# Patient Record
Sex: FEMALE | Race: AMERICAN INDIAN OR ALASKA NATIVE | NOT HISPANIC OR LATINO
[De-identification: names, ages, dates, MRNs, and addresses within clinical notes are randomized per-mention and may not be internally consistent; named-entity substitution may affect disease eponyms.]

---

## 2017-03-29 ENCOUNTER — RESULT REVIEW (OUTPATIENT)
Age: 67
End: 2017-03-29

## 2017-03-30 ENCOUNTER — APPOINTMENT (OUTPATIENT)
Dept: OBGYN | Facility: CLINIC | Age: 67
End: 2017-03-30

## 2017-04-08 ENCOUNTER — OUTPATIENT (OUTPATIENT)
Dept: OUTPATIENT SERVICES | Facility: HOSPITAL | Age: 67
LOS: 1 days | End: 2017-04-08
Payer: COMMERCIAL

## 2017-04-08 ENCOUNTER — APPOINTMENT (OUTPATIENT)
Dept: MRI IMAGING | Facility: IMAGING CENTER | Age: 67
End: 2017-04-08

## 2017-04-08 DIAGNOSIS — Z00.8 ENCOUNTER FOR OTHER GENERAL EXAMINATION: ICD-10-CM

## 2017-04-08 PROCEDURE — 70553 MRI BRAIN STEM W/O & W/DYE: CPT

## 2017-04-08 PROCEDURE — A9585: CPT

## 2017-04-08 PROCEDURE — 82565 ASSAY OF CREATININE: CPT

## 2018-05-10 ENCOUNTER — RESULT REVIEW (OUTPATIENT)
Age: 68
End: 2018-05-10

## 2018-05-10 ENCOUNTER — APPOINTMENT (OUTPATIENT)
Dept: OBGYN | Facility: CLINIC | Age: 68
End: 2018-05-10
Payer: MEDICARE

## 2018-05-10 PROCEDURE — G0101: CPT

## 2018-08-01 ENCOUNTER — EMERGENCY (EMERGENCY)
Facility: HOSPITAL | Age: 68
LOS: 1 days | Discharge: HOME | End: 2018-08-01
Attending: EMERGENCY MEDICINE
Payer: MEDICARE

## 2018-08-01 VITALS
SYSTOLIC BLOOD PRESSURE: 122 MMHG | HEIGHT: 65 IN | DIASTOLIC BLOOD PRESSURE: 83 MMHG | RESPIRATION RATE: 20 BRPM | HEART RATE: 79 BPM | WEIGHT: 115.08 LBS | OXYGEN SATURATION: 99 %

## 2018-08-01 PROCEDURE — 73564 X-RAY EXAM KNEE 4 OR MORE: CPT | Mod: 26,50

## 2018-08-01 PROCEDURE — 73130 X-RAY EXAM OF HAND: CPT

## 2018-08-01 PROCEDURE — 99284 EMERGENCY DEPT VISIT MOD MDM: CPT | Mod: GC

## 2018-08-01 PROCEDURE — 73110 X-RAY EXAM OF WRIST: CPT | Mod: 26,LT

## 2018-08-01 PROCEDURE — 99284 EMERGENCY DEPT VISIT MOD MDM: CPT

## 2018-08-01 PROCEDURE — 73564 X-RAY EXAM KNEE 4 OR MORE: CPT

## 2018-08-01 PROCEDURE — 73130 X-RAY EXAM OF HAND: CPT | Mod: 26,LT

## 2018-08-01 PROCEDURE — 73110 X-RAY EXAM OF WRIST: CPT

## 2018-08-01 RX ORDER — OXYCODONE AND ACETAMINOPHEN 5; 325 MG/1; MG/1
1 TABLET ORAL ONCE
Qty: 0 | Refills: 0 | Status: DISCONTINUED | OUTPATIENT
Start: 2018-08-01 | End: 2018-08-01

## 2018-08-01 RX ADMIN — OXYCODONE AND ACETAMINOPHEN 1 TABLET(S): 5; 325 TABLET ORAL at 15:21

## 2018-08-01 NOTE — ED PROVIDER NOTE - PMH
Esophageal cancer    Fracture  right 3 rd & 4 th metatarsal  Hyperlipidemia    Hypothyroidism    PFO (patent foramen ovale)  old history , not in recent imagings

## 2018-08-01 NOTE — ED ADULT NURSE NOTE - OBJECTIVE STATEMENT
67 yr old female a/oX 3 c/o mechanical fall and c/o left arm and b/l knee pain.  Denies head trauma or loc.  No midline c spine tenderness.  Normal Rom and neurovascular status in all 4 extremities

## 2018-08-01 NOTE — ED PROVIDER NOTE - CARE PLAN
Principal Discharge DX:	Contusion of knee, unspecified laterality, initial encounter  Secondary Diagnosis:	Multiple abrasions  Secondary Diagnosis:	Hand abrasion, infected, unspecified laterality, initial encounter

## 2018-08-01 NOTE — ED PROVIDER NOTE - PHYSICAL EXAMINATION
Gen: AAOx3, non-toxic  Head: NCAT, full ROM of neck, no midline tenderness in entire spine.   HEENT: EOMI, oral mucosa moist, normal conjunctiva  Lung: CTAB, no respiratory distress, no wheezes/rhonchi/rales B/L, speaking in full sentences  CV: RRR, no murmurs, rubs or gallops  Abd: soft, NTND, no guarding, no CVA tenderness  MSK: pain on passive and active ranging of the bilateral knees and left wrist + TTP in those areas as well   Neuro: general numbness of the left hand. No focal sensory or motor deficits, normal CN exam   Skin: Abrasions as described in HPI. Warm, well perfused, no rash  Psych: normal affect.   ~Arias Degroot PGY1 Gen: AAOx3, non-toxic  Head: NCAT, full ROM of neck, no midline tenderness in entire spine.   HEENT: EOMI, oral mucosa moist, normal conjunctiva  Lung: CTAB, no respiratory distress, no wheezes/rhonchi/rales B/L, speaking in full sentences  CV: RRR, no murmurs, rubs or gallops  Abd: soft, NTND, no guarding, no CVA tenderness  MSK: pain on passive and active ranging of the bilateral knees and left wrist + TTP in those areas as well   Neuro: general numbness of the left hand. No focal sensory or motor deficits, normal CN exam   Skin: Abrasions as described in HPI. Warm, well perfused, no rash  Psych: normal affect.   ~Arias Degroot PGY1       Attending note. Patient is alert and in moderate distress. Head is normocephalic and atraumatic. Patient has no midline cervical tenderness. Patient has slight tenderness in the right and left trapezius muscles. Back and spine are nontender. Chest/RIBS nontender. There is no CVA tenderness. Abdomen is soft nontender. Pelvis and hips are nontender. Chest for range of motion of the upper extremities bilaterally except for her left wrist. There is an abrasion on the palm of the left hand. There is no deformity, swelling or ecchymosis of the left wrist or hand area sensation is intact and normal. Examination of the lower extremity reveals bilateral abrasions of the anterior knees with right greater than left. Patient is able to flex both knees to approximately 90°. There is no swelling or effusions of the knees. Lower legs ankles and feet are unremarkable. Neurologic examination is grossly intact.

## 2018-08-01 NOTE — ED PROVIDER NOTE - OBJECTIVE STATEMENT
66 y/o female with PMHx esophageal cancer s/p esophagectomy presenting to the ED with L arm and bilateral knee pain after a fall. She states that she was walking home from her place of work when she tripped over a crack in the pavement and fell on her knees and left arm. She states that she "cannot remember" whether or not she hit her head or loss consciousness. Since the fall she has been experiencing pain over both knee caps with associates abrasions, as well as pain and numbness over the palmar aspect of the left hand with a small abrasion at the base of the fourth finger. Also denies any headache, changes in vision, weakness, paralysis, confusion, nausea, vomiting, abdominal pain, chest pain, or SOB. States that she is up to date on her vaccinations and that she received her tetanus vaccine last year. Not on blood thinners, no history osteoporosis. Son who is in the room states that the pt is at her baseline mental status. 66 y/o female with PMHx esophageal cancer s/p esophagectomy presenting to the ED with L arm and bilateral knee pain after a fall. She states that she was walking home from her place of work when she tripped over a crack in the pavement and fell on her knees and left arm. She states that she "cannot remember" whether or not she hit her head or loss consciousness. Since the fall she has been experiencing pain over both knee caps with associates abrasions, as well as pain and numbness over the palmar aspect of the left hand with a small abrasion at the base of the fourth finger. Also denies any headache, changes in vision, weakness, paralysis, confusion, nausea, vomiting, abdominal pain, chest pain, or SOB. States that she is up to date on her vaccinations and that she received her tetanus vaccine last year. Not on blood thinners, no history osteoporosis. Son who is in the room states that the pt is at her baseline mental status.        Attending note. Patient was seen in the fast track from #3. Agree with the above. Patient tripped over uneven pavement causing her to fall. Patient sustained injuries and abrasions to the palm of the left hand as well as both knees. Patient is complaining of some stiffness in her neck and lower back. Patient denies any head injury, chest/rib injury, pain in the hips, ankles, elbows or shoulders. Patient has no numbness or paresthesia. Patient has no headaches, dizziness, nausea or vomiting.

## 2018-08-01 NOTE — ED PROVIDER NOTE - MEDICAL DECISION MAKING DETAILS
66 y/o female with PMHx esophageal cancer s/p esophagectomy presenting to the ED with L arm and bilateral knee pain + associated abrasions after an accidental fall. Due to mechanism and pain on ranging affected joints, fractures of the pattellas and distal radius/carpal bones must be ruled out. Will order x-ray of the bilateral knees + left wrist/hand. Abrasions are superficial and do not require suture repair. The patient states that she cannot recall whether or not she hit her head or suffered LOC, but there is no evidence of trauma, no midline spinal tenderness, normal cervical ROM, no focal neurologic findings, and the pt is at normal mentation per son--no concern for intracranial or C-spine pathology at this time. Will reassess following image results. 68 y/o female with PMHx esophageal cancer s/p esophagectomy presenting to the ED with L arm and bilateral knee pain + associated abrasions after an accidental fall. Due to mechanism and pain on ranging affected joints, fractures of the pattellas and distal radius/carpal bones must be ruled out. Will order x-ray of the bilateral knees + left wrist/hand. Abrasions are superficial and do not require suture repair. The patient states that she cannot recall whether or not she hit her head or suffered LOC, but there is no evidence of trauma, no midline spinal tenderness, normal cervical ROM, no focal neurologic findings, and the pt is at normal mentation per son--no concern for intracranial or C-spine pathology at this time. Will reassess following image results.       Attending note-trip and fall on uneven pavement. Abrasions to left palm and bilateral knees. X-rays rule out fractures no low suspicion. Analgesia, wound care. Tetanus is up-to-date.

## 2018-08-01 NOTE — ED PROVIDER NOTE - NS ED ROS FT
GENERAL: No fever or chills  EYES: no change in vision  HEENT: no trouble swallowing or speaking  CARDIAC: no chest pain  PULMONARY: no cough or SOB  GI: no abdominal pain, no nausea, no vomiting, no diarrhea or constipation  : No changes in urination  SKIN: abrasions over both patella, small lac on left palm at base of 4th finger  NEURO: numbness of left hand, no headache, changes in vision, weakness, paralysis   MSK: pain of both knees and left hand     ~Arias Degroot PGY1

## 2019-03-27 ENCOUNTER — RESULT REVIEW (OUTPATIENT)
Age: 69
End: 2019-03-27

## 2019-03-28 ENCOUNTER — APPOINTMENT (OUTPATIENT)
Dept: OBGYN | Facility: CLINIC | Age: 69
End: 2019-03-28
Payer: MEDICARE

## 2019-03-28 PROCEDURE — G0101: CPT

## 2019-04-16 ENCOUNTER — APPOINTMENT (OUTPATIENT)
Dept: OBGYN | Facility: CLINIC | Age: 69
End: 2019-04-16
Payer: MEDICARE

## 2019-04-16 PROCEDURE — 57454 BX/CURETT OF CERVIX W/SCOPE: CPT

## 2019-04-17 ENCOUNTER — RESULT REVIEW (OUTPATIENT)
Age: 69
End: 2019-04-17

## 2019-07-16 ENCOUNTER — EMERGENCY (EMERGENCY)
Facility: HOSPITAL | Age: 69
LOS: 1 days | Discharge: HOME | End: 2019-07-16
Attending: EMERGENCY MEDICINE
Payer: COMMERCIAL

## 2019-07-16 VITALS
RESPIRATION RATE: 18 BRPM | SYSTOLIC BLOOD PRESSURE: 107 MMHG | HEART RATE: 91 BPM | HEIGHT: 66 IN | OXYGEN SATURATION: 99 % | DIASTOLIC BLOOD PRESSURE: 72 MMHG | TEMPERATURE: 98 F | WEIGHT: 113.1 LBS

## 2019-07-16 PROCEDURE — 99283 EMERGENCY DEPT VISIT LOW MDM: CPT

## 2019-07-16 RX ORDER — IBUPROFEN 200 MG
600 TABLET ORAL ONCE
Refills: 0 | Status: COMPLETED | OUTPATIENT
Start: 2019-07-16 | End: 2019-07-16

## 2019-07-16 RX ORDER — LIDOCAINE 4 G/100G
1 CREAM TOPICAL ONCE
Refills: 0 | Status: COMPLETED | OUTPATIENT
Start: 2019-07-16 | End: 2019-07-16

## 2019-07-16 RX ORDER — ACETAMINOPHEN 500 MG
975 TABLET ORAL ONCE
Refills: 0 | Status: COMPLETED | OUTPATIENT
Start: 2019-07-16 | End: 2019-07-16

## 2019-07-16 RX ADMIN — Medication 600 MILLIGRAM(S): at 21:28

## 2019-07-16 RX ADMIN — Medication 975 MILLIGRAM(S): at 21:28

## 2019-07-16 RX ADMIN — LIDOCAINE 1 PATCH: 4 CREAM TOPICAL at 21:28
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
## 2019-07-16 NOTE — ED PROVIDER NOTE - NSFOLLOWUPINSTRUCTIONS_ED_ALL_ED_FT
- stay hydrated.   - alternate between tylenol 975mg and ibuprofen 600mg every 4 hours as needed for pain-take with meals.  - follow up with your pcp in 1-2 days.  - return if symptoms worsen, fever, weakness, numbness/tingling, blurred vision, difficulty ambulating and all other concerns. - stay hydrated.   - alternate between tylenol 975mg and ibuprofen 600mg every 4 hours as needed for pain-take with meals. Use salon-pas lidocaine patches every 12  hours as needed for pain .  - follow up with your pcp in 1-2 days.  - return if symptoms worsen, fever, weakness, numbness/tingling, blurred vision, difficulty ambulating and all other concerns.
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
## 2019-07-16 NOTE — ED PROVIDER NOTE - PHYSICAL EXAMINATION
A&Ox3, NAD. NCAT. PERRL, EOMI. Neck supple, + tenderness to superior trapezius, no vertebral tenderness no LAD. Lungs CTAB. +S1S2, RRR, No m/r/g. Abd soft, NT/ND, +BS, no rebound or guarding. Extremities: cap refill <2, pulses in distal extremities 4+, no edema. Skin without rash. CN II-XII intact. Strength 5/5 UE/LE. Left LE ROM decreased d/t pain Sensations intact throughout. Gait steady.
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
## 2019-07-16 NOTE — ED PROVIDER NOTE - NS ED MD DISPO DISCHARGE CCDA
Problem: Physical Therapy Goal  Goal: Physical Therapy Goal  Pt evaluated and demo ability to perform functional mobility without assistance - currently limited by pain.  PT orders to be d/c as pt scheduled for sx tomorrow per pt's report and chart review.  D/C note to follow.      Outcome: Outcome(s) achieved Date Met: 03/20/19  Jennifer and D/C    Campbell Sinclair, PT,DPT  3/20/2019           Patient/Caregiver provided printed discharge information.

## 2020-06-24 ENCOUNTER — APPOINTMENT (OUTPATIENT)
Dept: NUCLEAR MEDICINE | Facility: IMAGING CENTER | Age: 70
End: 2020-06-24
Payer: MEDICARE

## 2020-06-24 ENCOUNTER — OUTPATIENT (OUTPATIENT)
Dept: OUTPATIENT SERVICES | Facility: HOSPITAL | Age: 70
LOS: 1 days | End: 2020-06-24
Payer: MEDICARE

## 2020-06-24 DIAGNOSIS — Z00.8 ENCOUNTER FOR OTHER GENERAL EXAMINATION: ICD-10-CM

## 2020-06-24 PROCEDURE — 78815 PET IMAGE W/CT SKULL-THIGH: CPT | Mod: 26,PI

## 2020-06-24 PROCEDURE — 78815 PET IMAGE W/CT SKULL-THIGH: CPT

## 2020-06-24 PROCEDURE — A9552: CPT

## 2020-06-29 ENCOUNTER — APPOINTMENT (OUTPATIENT)
Dept: CT IMAGING | Facility: IMAGING CENTER | Age: 70
End: 2020-06-29
Payer: MEDICARE

## 2020-06-29 ENCOUNTER — OUTPATIENT (OUTPATIENT)
Dept: OUTPATIENT SERVICES | Facility: HOSPITAL | Age: 70
LOS: 1 days | End: 2020-06-29
Payer: MEDICARE

## 2020-06-29 DIAGNOSIS — Z00.8 ENCOUNTER FOR OTHER GENERAL EXAMINATION: ICD-10-CM

## 2020-06-29 PROCEDURE — 74177 CT ABD & PELVIS W/CONTRAST: CPT | Mod: 26

## 2020-06-29 PROCEDURE — 82565 ASSAY OF CREATININE: CPT

## 2020-06-29 PROCEDURE — 74177 CT ABD & PELVIS W/CONTRAST: CPT

## 2020-06-29 PROCEDURE — 71260 CT THORAX DX C+: CPT | Mod: 26

## 2020-06-29 PROCEDURE — 71260 CT THORAX DX C+: CPT

## 2020-11-23 NOTE — ED PROVIDER NOTE - NS ED ATTENDING STATEMENT MOD
normal appearance , without tenderness upon palpation , no deformities , trachea midline , Thyroid normal size , no thyroid nodules , no masses , no JVD , thyroid nontender I have personally seen and examined this patient.  I have fully participated in the care of this patient. I have reviewed all pertinent clinical information, including history, physical exam, plan and the Resident’s note and agree except as noted.

## 2021-05-04 ENCOUNTER — FORM ENCOUNTER (OUTPATIENT)
Age: 71
End: 2021-05-04

## 2021-08-09 ENCOUNTER — FORM ENCOUNTER (OUTPATIENT)
Age: 71
End: 2021-08-09

## 2021-08-10 ENCOUNTER — RESULT REVIEW (OUTPATIENT)
Age: 71
End: 2021-08-10

## 2021-08-10 ENCOUNTER — APPOINTMENT (OUTPATIENT)
Dept: OBGYN | Facility: CLINIC | Age: 71
End: 2021-08-10
Payer: MEDICARE

## 2021-08-10 PROCEDURE — G0101: CPT

## 2021-08-16 ENCOUNTER — FORM ENCOUNTER (OUTPATIENT)
Age: 71
End: 2021-08-16

## 2021-08-17 ENCOUNTER — FORM ENCOUNTER (OUTPATIENT)
Age: 71
End: 2021-08-17

## 2021-08-26 ENCOUNTER — FORM ENCOUNTER (OUTPATIENT)
Age: 71
End: 2021-08-26

## 2022-06-20 DIAGNOSIS — Z78.0 ASYMPTOMATIC MENOPAUSAL STATE: ICD-10-CM

## 2022-06-20 DIAGNOSIS — Z80.51 FAMILY HISTORY OF MALIGNANT NEOPLASM OF KIDNEY: ICD-10-CM

## 2022-06-20 DIAGNOSIS — E78.00 PURE HYPERCHOLESTEROLEMIA, UNSPECIFIED: ICD-10-CM

## 2022-06-20 DIAGNOSIS — Z85.01 PERSONAL HISTORY OF MALIGNANT NEOPLASM OF ESOPHAGUS: ICD-10-CM

## 2022-06-20 DIAGNOSIS — E03.9 HYPOTHYROIDISM, UNSPECIFIED: ICD-10-CM

## 2022-06-20 DIAGNOSIS — Z86.19 PERSONAL HISTORY OF OTHER INFECTIOUS AND PARASITIC DISEASES: ICD-10-CM

## 2022-06-20 DIAGNOSIS — Z80.49 FAMILY HISTORY OF MALIGNANT NEOPLASM OF OTHER GENITAL ORGANS: ICD-10-CM

## 2022-06-20 RX ORDER — CALCIUM CITRATE/VITAMIN D3 315MG-6.25
TABLET ORAL
Refills: 0 | Status: ACTIVE | COMMUNITY

## 2022-06-20 RX ORDER — OMEPRAZOLE 20 MG/1
TABLET, DELAYED RELEASE ORAL
Refills: 0 | Status: ACTIVE | COMMUNITY

## 2022-06-20 RX ORDER — LEVOTHYROXINE SODIUM 0.05 MG/1
50 TABLET ORAL
Refills: 0 | Status: ACTIVE | COMMUNITY

## 2022-06-20 RX ORDER — ATORVASTATIN CALCIUM 20 MG/1
20 TABLET, FILM COATED ORAL
Refills: 0 | Status: ACTIVE | COMMUNITY

## 2022-08-30 ENCOUNTER — APPOINTMENT (OUTPATIENT)
Dept: OBGYN | Facility: CLINIC | Age: 72
End: 2022-08-30

## 2022-08-30 VITALS
WEIGHT: 105 LBS | DIASTOLIC BLOOD PRESSURE: 81 MMHG | BODY MASS INDEX: 18.61 KG/M2 | HEIGHT: 63 IN | SYSTOLIC BLOOD PRESSURE: 135 MMHG

## 2022-08-30 DIAGNOSIS — Z00.00 ENCOUNTER FOR GENERAL ADULT MEDICAL EXAMINATION W/OUT ABNORMAL FINDINGS: ICD-10-CM

## 2022-08-30 PROCEDURE — 99213 OFFICE O/P EST LOW 20 MIN: CPT

## 2022-08-30 NOTE — HISTORY OF PRESENT ILLNESS
[FreeTextEntry1] : 73 yo , postmenopausal, presents for f/u of abnormal pap. Pap 2021, revealed LSIL. She feels well and offers no complaints. Pt plans to get breast mammo/sono in October. \par \par GYNHx: HPV, LSIL (2021)\par PMHx: high cholesterol, hypothyroidism, esophageal CA\par PSHx: esophagectomy \par  [PapSmeardate] : 8/2021 [TextBox_31] : LSIL [ColonoscopyDate] : 2019

## 2022-08-30 NOTE — END OF VISIT
[FreeTextEntry3] : I, Celine Rehman, acted as a scribe on behalf of Dr. Mara Saeed on 08/30/2022. \par \par All medical entries made by the scribe where at my, Dr. Mara Saeed, direction and personally dictated by me on 08/30/2022. I have reviewed the chart and agree that the record accurately reflects my personal performance of the history, physical exam, assessment, and plan. I have also personally directed, reviewed and agreed with the chart.\par

## 2022-08-30 NOTE — PLAN
[FreeTextEntry1] : 71 yo , postmenopausal, abnormal pap. \par \par Abnormal pap \par -repeat pap done today\par -vit D/exercise/calcium\par -rx given for bone density\par -rx given for breast mammo/sono ([facility])\par -colon screening reviewed, utd. \par -f/u PCP for annual and appropriate immunizations\par -rto 1 year for annual. \par

## 2022-09-07 LAB
CYTOLOGY CVX/VAG DOC THIN PREP: ABNORMAL
HPV HIGH+LOW RISK DNA PNL CVX: DETECTED

## 2022-10-21 ENCOUNTER — APPOINTMENT (OUTPATIENT)
Dept: OBGYN | Facility: CLINIC | Age: 72
End: 2022-10-21

## 2022-10-21 VITALS
SYSTOLIC BLOOD PRESSURE: 124 MMHG | BODY MASS INDEX: 19.14 KG/M2 | WEIGHT: 108 LBS | DIASTOLIC BLOOD PRESSURE: 78 MMHG | HEIGHT: 63 IN

## 2022-10-21 PROCEDURE — 57454 BX/CURETT OF CERVIX W/SCOPE: CPT

## 2022-10-21 NOTE — ASSESSMENT
[FreeTextEntry1] : 71 yo , presents for colposcopy. Pap 22 revealed LSIL, prior pap 2021 revealed LSIL as well. \par \par GYNHx: HPV, LSIL (2021)\par PMHx: high cholesterol, hypothyroidism, esophageal CA\par PSHx: esophagectomy \par

## 2022-10-21 NOTE — END OF VISIT
[FreeTextEntry3] : I, Celine Rehman, acted as a scribe on behalf of Dr. Mara Saeed on 10/21/2022. \par \par All medical entries made by the scribe where at my, Dr. Mara Saeed, direction and personally dictated by me on 10/21/2022. I have reviewed the chart and agree that the record accurately reflects my personal performance of the history, physical exam, assessment, and plan. I have also personally directed, reviewed and agreed with the chart.\par

## 2022-10-21 NOTE — PROCEDURE
[Colposcopy] : Colposcopy  [Time out performed] : Pre-procedure time out performed.  Patient's name, date of birth and procedure confirmed. [Consent Obtained] : Consent obtained [Risks] : risks [Benefits] : benefits [Alternatives] : alternatives [Patient] : patient [Infection] : infection [Bleeding] : bleeding [Allergic Reaction] : allergic reaction [Hemostasis Obtained] : Hemostasis obtained [Tolerated Well] : the patient tolerated the procedure well [No Premedication] : no premedication [Colposcopy Adequate] : colposcopy adequate [SCI Fully Visualized] : SCI fully visualized [ECC Performed] : ECC performed [No Abnormalities] : no abnormalities [Biopsy] : biopsy taken [de-identified] : LSIL [de-identified] : 1 [de-identified] : 9 oclock [de-identified] : LSIL

## 2022-11-01 LAB — CORE LAB BIOPSY: NORMAL

## 2023-07-21 ENCOUNTER — APPOINTMENT (OUTPATIENT)
Dept: NUCLEAR MEDICINE | Facility: IMAGING CENTER | Age: 73
End: 2023-07-21
Payer: MEDICARE

## 2023-07-21 ENCOUNTER — OUTPATIENT (OUTPATIENT)
Dept: OUTPATIENT SERVICES | Facility: HOSPITAL | Age: 73
LOS: 1 days | End: 2023-07-21
Payer: MEDICARE

## 2023-07-21 DIAGNOSIS — C15.8 MALIGNANT NEOPLASM OF OVERLAPPING SITES OF ESOPHAGUS: ICD-10-CM

## 2023-07-21 PROCEDURE — A9552: CPT

## 2023-07-21 PROCEDURE — 78815 PET IMAGE W/CT SKULL-THIGH: CPT | Mod: 26,PS,MH

## 2023-07-21 PROCEDURE — 78815 PET IMAGE W/CT SKULL-THIGH: CPT | Mod: MH

## 2023-11-10 ENCOUNTER — OUTPATIENT (OUTPATIENT)
Dept: OUTPATIENT SERVICES | Facility: HOSPITAL | Age: 73
LOS: 1 days | End: 2023-11-10
Payer: MEDICARE

## 2023-11-10 ENCOUNTER — APPOINTMENT (OUTPATIENT)
Dept: ULTRASOUND IMAGING | Facility: IMAGING CENTER | Age: 73
End: 2023-11-10
Payer: MEDICARE

## 2023-11-10 DIAGNOSIS — E04.2 NONTOXIC MULTINODULAR GOITER: ICD-10-CM

## 2023-11-10 PROCEDURE — 76536 US EXAM OF HEAD AND NECK: CPT

## 2023-11-10 PROCEDURE — 76536 US EXAM OF HEAD AND NECK: CPT | Mod: 26

## 2023-12-02 ENCOUNTER — NON-APPOINTMENT (OUTPATIENT)
Age: 73
End: 2023-12-02

## 2024-01-09 DIAGNOSIS — Z12.39 ENCOUNTER FOR OTHER SCREENING FOR MALIGNANT NEOPLASM OF BREAST: ICD-10-CM

## 2024-01-09 DIAGNOSIS — Z13.820 ENCOUNTER FOR SCREENING FOR OSTEOPOROSIS: ICD-10-CM

## 2024-01-09 DIAGNOSIS — R92.30 DENSE BREASTS, UNSPECIFIED: ICD-10-CM

## 2024-03-07 ENCOUNTER — APPOINTMENT (OUTPATIENT)
Dept: OBGYN | Facility: CLINIC | Age: 74
End: 2024-03-07
Payer: MEDICARE

## 2024-03-07 VITALS
BODY MASS INDEX: 18.61 KG/M2 | SYSTOLIC BLOOD PRESSURE: 122 MMHG | WEIGHT: 105 LBS | HEIGHT: 63 IN | DIASTOLIC BLOOD PRESSURE: 75 MMHG

## 2024-03-07 DIAGNOSIS — R87.612 LOW GRADE SQUAMOUS INTRAEPITHELIAL LESION ON CYTOLOGIC SMEAR OF CERVIX (LGSIL): ICD-10-CM

## 2024-03-07 PROCEDURE — 99213 OFFICE O/P EST LOW 20 MIN: CPT

## 2024-03-07 NOTE — PLAN
[FreeTextEntry1] : 73 year old female presents for repeat pap  -PAP done  -s/p Breast biopsy- intraductal papilloma and focal hyperplasia --pt to f/u Breast MD- Dr. Monson

## 2024-03-07 NOTE — HISTORY OF PRESENT ILLNESS
[FreeTextEntry1] : 73 year old female presents for f/u pap. H/o LGSIL pap w/o nml colpo. Pt is doing well with no complaints

## 2024-03-07 NOTE — END OF VISIT
[FreeTextEntry3] : I, Jessica Tay, acted as a scribe on behalf of Dr. Mara Saeed D.O. on 03/07/2024.  All medical entries made by the scribe were at my, Dr. Mara Saeed D.O, direction and personally dictated by me on 03/07/2024. I have reviewed the chart and agree that the record accurately reflects my personal performance of the history, physical exam, assessment and plan. I have also personally directed, reviewed, and agreed with the chart.

## 2024-03-08 LAB — HPV HIGH+LOW RISK DNA PNL CVX: NOT DETECTED

## 2024-03-12 LAB — CYTOLOGY CVX/VAG DOC THIN PREP: NORMAL

## 2024-03-19 ENCOUNTER — APPOINTMENT (OUTPATIENT)
Dept: SURGICAL ONCOLOGY | Facility: CLINIC | Age: 74
End: 2024-03-19
Payer: MEDICARE

## 2024-03-19 VITALS
BODY MASS INDEX: 17.49 KG/M2 | SYSTOLIC BLOOD PRESSURE: 123 MMHG | OXYGEN SATURATION: 99 % | WEIGHT: 105 LBS | HEIGHT: 65 IN | HEART RATE: 84 BPM | DIASTOLIC BLOOD PRESSURE: 79 MMHG

## 2024-03-19 DIAGNOSIS — N63.0 UNSPECIFIED LUMP IN UNSPECIFIED BREAST: ICD-10-CM

## 2024-03-19 PROCEDURE — 99213 OFFICE O/P EST LOW 20 MIN: CPT

## 2024-03-19 NOTE — ASSESSMENT
[FreeTextEntry1] : IMP: 72yo F w/ hx of B/L breast excisions now w/ R IDP & ADH  R stereo bx 2/8/2024 - R 9:00 N2 ('T' clip): IDP w/ focus of ADH  PLAN: right breast magseed lumpectomy   All medical entries were at my, Dr. Ok Monson, direction.  I have reviewed the chart and agree that the record accurately reflects my personal performance of the history, physical exam, assessment, and plan.  Our office nurse practitioner was present for the duration of the office visit.

## 2024-03-19 NOTE — PHYSICAL EXAM
[Normal Neck Lymph Nodes] : normal neck lymph nodes  [Normal Axillary Lymph Nodes] : normal axillary lymph nodes [Normal Supraclavicular Lymph Nodes] : normal supraclavicular lymph nodes [Normal] : grossly intact [FreeTextEntry1] : GS present for exam [de-identified] : Normal S1, S2. regular rate and rhythm. [de-identified] : Complete breast exam performed in supine and upright positions. No palpable masses, tenderness, nipple discharge, inversion, deviation or enlarged axillary or supraclavicular lymph nodes bilaterally. [de-identified] : Clear breath sounds bilaterally, normal respiratory effort.

## 2024-03-19 NOTE — HISTORY OF PRESENT ILLNESS
[de-identified] : Ms. KIRBY NOLASCO is a 73 year old woman, referred by Dr. Mara Saeed for consultation regarding an IDP, here for an initial visit.  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

## 2024-03-19 NOTE — CONSULT LETTER
[Consult Letter:] : I had the pleasure of evaluating your patient, [unfilled]. [Sincerely,] : Sincerely, [FreeTextEntry3] : Ok Monson M.D  [FreeTextEntry2] : Mara Saeed

## 2024-03-27 ENCOUNTER — RESULT REVIEW (OUTPATIENT)
Age: 74
End: 2024-03-27

## 2024-03-27 ENCOUNTER — OUTPATIENT (OUTPATIENT)
Dept: OUTPATIENT SERVICES | Facility: HOSPITAL | Age: 74
LOS: 1 days | End: 2024-03-27
Payer: MEDICARE

## 2024-03-27 DIAGNOSIS — C80.1 MALIGNANT (PRIMARY) NEOPLASM, UNSPECIFIED: ICD-10-CM

## 2024-03-27 PROCEDURE — 88321 CONSLTJ&REPRT SLD PREP ELSWR: CPT

## 2024-03-28 LAB — SURGICAL PATHOLOGY STUDY: SIGNIFICANT CHANGE UP

## 2024-04-05 ENCOUNTER — RESULT REVIEW (OUTPATIENT)
Age: 74
End: 2024-04-05

## 2024-04-05 DIAGNOSIS — N60.99 UNSPECIFIED BENIGN MAMMARY DYSPLASIA OF UNSPECIFIED BREAST: ICD-10-CM

## 2024-04-16 ENCOUNTER — APPOINTMENT (OUTPATIENT)
Dept: MAMMOGRAPHY | Facility: CLINIC | Age: 74
End: 2024-04-16
Payer: MEDICARE

## 2024-04-16 PROCEDURE — 19281 PERQ DEVICE BREAST 1ST IMAG: CPT | Mod: RT

## 2024-04-16 PROCEDURE — A4648: CPT

## 2024-04-18 ENCOUNTER — OUTPATIENT (OUTPATIENT)
Dept: OUTPATIENT SERVICES | Facility: HOSPITAL | Age: 74
LOS: 1 days | End: 2024-04-18

## 2024-04-18 VITALS
RESPIRATION RATE: 18 BRPM | WEIGHT: 108.03 LBS | HEIGHT: 65.25 IN | TEMPERATURE: 98 F | DIASTOLIC BLOOD PRESSURE: 69 MMHG | SYSTOLIC BLOOD PRESSURE: 144 MMHG | OXYGEN SATURATION: 97 % | HEART RATE: 79 BPM

## 2024-04-18 VITALS
HEIGHT: 65.25 IN | RESPIRATION RATE: 16 BRPM | SYSTOLIC BLOOD PRESSURE: 107 MMHG | TEMPERATURE: 98 F | DIASTOLIC BLOOD PRESSURE: 68 MMHG | HEART RATE: 78 BPM | WEIGHT: 108.03 LBS | OXYGEN SATURATION: 98 %

## 2024-04-18 DIAGNOSIS — N60.91 UNSPECIFIED BENIGN MAMMARY DYSPLASIA OF RIGHT BREAST: ICD-10-CM

## 2024-04-18 DIAGNOSIS — E05.90 THYROTOXICOSIS, UNSPECIFIED WITHOUT THYROTOXIC CRISIS OR STORM: ICD-10-CM

## 2024-04-18 DIAGNOSIS — Z98.890 OTHER SPECIFIED POSTPROCEDURAL STATES: Chronic | ICD-10-CM

## 2024-04-18 DIAGNOSIS — N60.99 UNSPECIFIED BENIGN MAMMARY DYSPLASIA OF UNSPECIFIED BREAST: ICD-10-CM

## 2024-04-18 DIAGNOSIS — S92.309A FRACTURE OF UNSPECIFIED METATARSAL BONE(S), UNSPECIFIED FOOT, INITIAL ENCOUNTER FOR CLOSED FRACTURE: Chronic | ICD-10-CM

## 2024-04-18 DIAGNOSIS — R20.0 ANESTHESIA OF SKIN: ICD-10-CM

## 2024-04-18 DIAGNOSIS — E04.9 NONTOXIC GOITER, UNSPECIFIED: ICD-10-CM

## 2024-04-18 DIAGNOSIS — Z92.3 PERSONAL HISTORY OF IRRADIATION: Chronic | ICD-10-CM

## 2024-04-18 NOTE — H&P PST ADULT - HISTORY OF PRESENT ILLNESS
72 y/o female PMH esophageal cancer s/p esophagectomy, radiation (26 sessions) and chemotherapy- reports normal surveillance, hyperthyroidism (on methimazole) presents to presurgical testing with diagnosis of unspecified benign mammary dysplasia unspecified breast. Pt with an abnormal mammogram s/p biopsy. Pt is scheduled for a right breast lumpectomy with mag seed localization.

## 2024-04-18 NOTE — H&P PST ADULT - NSICDXPASTSURGICALHX_GEN_ALL_CORE_FT
PAST SURGICAL HISTORY:  Esophageal cancer s/p esophagectomy    H/O colonoscopy     H/O right breast biopsy     History of radiation to head and neck region     Metatarsal bone fracture     S/P thyroid biopsy

## 2024-04-18 NOTE — H&P PST ADULT - ENDOCRINE COMMENTS
denies Diabetes. h/o hyperthyroidism on methimazole, reports stable methimazole dosing x 1 year, has routine TFT with endocrinologist, last about 2 months ago, reports normal results

## 2024-04-18 NOTE — H&P PST ADULT - OPHTHALMOLOGIC COMMENTS
Plan: Discussed various treatment options.  Patient would like to try WartPeel.  Prescription filled out and faxed to [facility] pharmacy.  Apply as directed.  The patient will let us know if the warts persist. Detail Level: Detailed reading glasses

## 2024-04-18 NOTE — H&P PST ADULT - NSICDXPASTMEDICALHX_GEN_ALL_CORE_FT
PAST MEDICAL HISTORY:  Esophageal cancer     Fracture right 3 rd & 4 th metatarsal    Hyperlipidemia     Hyperthyroidism     PFO (patent foramen ovale) old history , not in recent imagings

## 2024-04-18 NOTE — H&P PST ADULT - PROBLEM SELECTOR PLAN 3
h/o hyperthyroidism on methimazole, reports stable methimazole dosing x 1 year, has routine TFT with endocrinologist, last about 2 months ago, reports normal results.    Patient instructed to take methimazole with a sip of water on the morning of procedure.     Case discussed with Dr Wallace Calderón. No further testing recommended.

## 2024-04-18 NOTE — H&P PST ADULT - PRIMARY CARE PROVIDER
Dr Hull cardiologist  [phone]                                                                                                                        Dr Goldberg [facility] [phone]

## 2024-04-18 NOTE — H&P PST ADULT - PROBLEM SELECTOR PLAN 1
Patient tentatively scheduled for right breast lumpectomy with mag seed localization for 4/19/24. Pre-op instructions provided. Pt given verbal and written instructions with teach back on chlorhexidine shampoo. Pt will take own omeprazole on the morning of procedure for GI prophylaxis. Pt verbalized understanding with return demonstration.

## 2024-04-18 NOTE — H&P PST ADULT - NEGATIVE NEUROLOGICAL SYMPTOMS
no transient paralysis/no weakness/no paresthesias/no generalized seizures/no difficulty walking/no headache/no loss of consciousness

## 2024-04-19 ENCOUNTER — TRANSCRIPTION ENCOUNTER (OUTPATIENT)
Age: 74
End: 2024-04-19

## 2024-04-19 ENCOUNTER — OUTPATIENT (OUTPATIENT)
Dept: OUTPATIENT SERVICES | Facility: HOSPITAL | Age: 74
LOS: 1 days | Discharge: HOME | End: 2024-04-19
Payer: MEDICARE

## 2024-04-19 ENCOUNTER — RESULT REVIEW (OUTPATIENT)
Age: 74
End: 2024-04-19

## 2024-04-19 ENCOUNTER — OUTPATIENT (OUTPATIENT)
Dept: OUTPATIENT SERVICES | Facility: HOSPITAL | Age: 74
LOS: 1 days | End: 2024-04-19
Payer: COMMERCIAL

## 2024-04-19 ENCOUNTER — APPOINTMENT (OUTPATIENT)
Dept: SURGICAL ONCOLOGY | Facility: AMBULATORY SURGERY CENTER | Age: 74
End: 2024-04-19

## 2024-04-19 ENCOUNTER — APPOINTMENT (OUTPATIENT)
Dept: MAMMOGRAPHY | Facility: IMAGING CENTER | Age: 74
End: 2024-04-19
Payer: MEDICARE

## 2024-04-19 VITALS
RESPIRATION RATE: 15 BRPM | HEART RATE: 78 BPM | SYSTOLIC BLOOD PRESSURE: 114 MMHG | OXYGEN SATURATION: 99 % | DIASTOLIC BLOOD PRESSURE: 75 MMHG | TEMPERATURE: 98 F

## 2024-04-19 DIAGNOSIS — S92.309A FRACTURE OF UNSPECIFIED METATARSAL BONE(S), UNSPECIFIED FOOT, INITIAL ENCOUNTER FOR CLOSED FRACTURE: Chronic | ICD-10-CM

## 2024-04-19 DIAGNOSIS — Z98.890 OTHER SPECIFIED POSTPROCEDURAL STATES: Chronic | ICD-10-CM

## 2024-04-19 DIAGNOSIS — Z92.3 PERSONAL HISTORY OF IRRADIATION: Chronic | ICD-10-CM

## 2024-04-19 DIAGNOSIS — N60.99 UNSPECIFIED BENIGN MAMMARY DYSPLASIA OF UNSPECIFIED BREAST: ICD-10-CM

## 2024-04-19 DIAGNOSIS — Z00.8 ENCOUNTER FOR OTHER GENERAL EXAMINATION: ICD-10-CM

## 2024-04-19 PROBLEM — E05.90 THYROTOXICOSIS, UNSPECIFIED WITHOUT THYROTOXIC CRISIS OR STORM: Chronic | Status: ACTIVE | Noted: 2024-04-18

## 2024-04-19 PROCEDURE — 19301 PARTIAL MASTECTOMY: CPT | Mod: RT

## 2024-04-19 PROCEDURE — 76098 X-RAY EXAM SURGICAL SPECIMEN: CPT

## 2024-04-19 PROCEDURE — 88307 TISSUE EXAM BY PATHOLOGIST: CPT | Mod: 26

## 2024-04-19 PROCEDURE — 76098 X-RAY EXAM SURGICAL SPECIMEN: CPT | Mod: 26

## 2024-04-19 RX ORDER — METHIMAZOLE 10 MG/1
1 TABLET ORAL
Refills: 0 | DISCHARGE

## 2024-04-19 RX ORDER — OMEPRAZOLE 10 MG/1
1 CAPSULE, DELAYED RELEASE ORAL
Refills: 0 | DISCHARGE

## 2024-04-19 NOTE — ASU DISCHARGE PLAN (ADULT/PEDIATRIC) - ***IN THE EVENT THAT YOU DEVELOP A COMPLICATION AND YOU ARE UNABLE TO REACH YOUR OWN PHYSICIAN, YOU MAY CONTACT:
Subjective: Pt agreeable to therapeutic plan of care.    Objective:     Bed mobility - N/A or Not attempted.  Transfers - Min-A and with rolling walker  Ambulation - 40 feet Min-A and with rolling walker    Pain: chronic LB and R knee pain    Education: Provided education on importance of mobility and skilled verbal / tactile cueing throughout intervention.     Assessment: Benson Mclean presents with functional mobility impairments which indicate the need for skilled intervention. Tolerating session today without incident.Patient on 2L O2, sats 93>98%. HR 73>77. Easily fatigued and challenged.  Will continue to follow and progress as tolerated. Will need rehab at [place]    Plan/Recommendations:   Pt would benefit from Inpatient Rehabilitation placement at discharge from facility and requires no DME at discharge.   Pt desires Inpatient Rehabilitation placement at discharge. Pt cooperative; agreeable to therapeutic recommendations and plan of care.     Basic Mobility 6-click:  Rollin = Total, A lot = 2, A little = 3; 4 = None  Supine>Sit:   1 = Total, A lot = 2, A little = 3; 4 = None   Sit>Stand with arms:  1 = Total, A lot = 2, A little = 3; 4 = None  Bed>Chair:   1 = Total, A lot = 2, A little = 3; 4 = None  Ambulate in room:  1 = Total, A lot = 2, A little = 3; 4 = None  3-5 Steps with railin = Total, A lot = 2, A little = 3; 4 = None  Score: 15      Post-Tx Position: Up in Chair, Alarms activated and Call light and personal items within reach  PPE: gloves, surgical mask, eyewear protection   Statement Selected

## 2024-04-19 NOTE — ASU DISCHARGE PLAN (ADULT/PEDIATRIC) - NS MD DC FALL RISK RISK
For information on Fall & Injury Prevention, visit: [email or web address] OR  [email or web address] OR  https://www.cdc.gov/steadi/patient.html

## 2024-04-19 NOTE — ASU DISCHARGE PLAN (ADULT/PEDIATRIC) - ASU DC SPECIAL INSTRUCTIONSFT
[facility]     Department of Surgery  Division of Surgical Oncology    Joe Sands M.D., FGriselAEVLIS.  Chief, Division of Surgical Oncology    Ibrahima Hammonds M.D., F.A.C.S.  Akil Cisneros M.D., F.A.C.S.  Jj David M.D., F.A.C.S.  Chaitanya Boykin M.D., F.A.C.S.  Akil Draper M.D., F.A.C.S.  Ok Monson M.D., F.A.C.S.      Breast Biopsy/Lumpectomy Post-operative Instructions  1.	Supportive bra- Please bring a sports/athletic bra with you on the day of your surgery.  You will wear it home from the hospital.  You should wear the supportive bra continuously for 48 hours after the procedure, including wearing it to sleep.  Thereafter, you may wear your regular bra.  You may remove the bra to shower.  The sports bra will provide support, decrease the amount of swelling at the biopsy site, and make your recovery more comfortable.    2.	Wound dressing- There is a dressing on the wound, which consists of 2 layers.  The outer layer is a clear plastic dressing (called Tegaderm) which is waterproof.  This should remain in place for 2 days post-operatively.  On the 2nd post-operative day, you should remove the clear plastic Tegaderm dressing.  Underneath the Tegaderm dressing, there are white surgical tapes (called steri strips) which are directly adherent to the skin.  These should remain on the skin, and will peel off naturally.  All of the stitches are “internal” and will dissolve naturally.    3.	Showering/Bathing- You may shower over the dressing the very next day after surgery.  Allow the water to run over the dressing, but don not scrub the area.  It is best not to sit in a bathtub or swimming pool for at least one week after surgery.    4.	Activity level- You may resume most normal daily activity as tolerated, but avoid strenuous activities such as aerobics, jogging, exercising or heavy lifting for at least 1 week after surgery.  You may return to work in 1-2 days after surgery.  You may drive as long as you are not taking any prescription pain medication.    5.	Pain Medication- You may take the prescribed medication, or you may take extra-strength Tylenol as needed.  Please don't take aspirin, Motrin, Advil or any other anti-inflammatory medications, as these medications may cause bleeding or bruising.    6.	Follow-up Appointment- Please call the office to schedule your post-operative appointment which should be approximately 10 days after your surgery. Office [phone]    7.	Bruising/Bleeding/Swelling- It is normal for there to be some bruising and swelling at the breast biopsy site, and there may be some staining of blood on to the dressing.  Some discomfort at the surgical site is expected.  If your symptoms seem excessive, or if you have any question or concerns, please call the office.      Anesthesia Precautions:  For the next 12 hours do not:   •	drive a car,  •	drink alcohol, beer, or wine,   •	make important personal or business decisions  Diet:   •	Progress diet slowly unless otherwise indicated    Physician Notification  •	Any Prescription issues  •	Bleeding that does not stop  •	Persistent nausea and vomiting  •	Pain not relieved by medications  •	Fever greater than 101®F  •	Inability to tolerate liquids or foods  •	Unable to urinate after 8 hours  Discharge and Disposition  •	Discharge to home/ group home/assisted living  •	Accompanied by Family/Spouse/ Parents/ Significant Other/ Friend/ and or Caregiver    Follow Up Care:  •	In the event that you develop a complication and you are unable to reach your own physician, you may contact:  [phone] or go to the nearest Emergency Room.   •	Please call your surgeon to schedule your follow up appointment [phone]

## 2024-04-19 NOTE — ASU DISCHARGE PLAN (ADULT/PEDIATRIC) - CARE PROVIDER_API CALL
Ok Monson  Surgery  [address] [place] [id number]  Phone: [phone]  Fax: [phone]  Follow Up Time: 2 weeks

## 2024-04-25 LAB — SURGICAL PATHOLOGY STUDY: SIGNIFICANT CHANGE UP

## 2024-05-16 PROBLEM — D05.00 LOBULAR CARCINOMA IN SITU (LCIS) OF BREAST: Status: ACTIVE | Noted: 2024-05-16

## 2024-05-22 ENCOUNTER — APPOINTMENT (OUTPATIENT)
Dept: SURGICAL ONCOLOGY | Facility: CLINIC | Age: 74
End: 2024-05-22
Payer: MEDICARE

## 2024-05-22 VITALS
OXYGEN SATURATION: 99 % | DIASTOLIC BLOOD PRESSURE: 75 MMHG | RESPIRATION RATE: 17 BRPM | WEIGHT: 104 LBS | HEART RATE: 82 BPM | SYSTOLIC BLOOD PRESSURE: 113 MMHG | BODY MASS INDEX: 17.33 KG/M2 | HEIGHT: 65 IN

## 2024-05-22 DIAGNOSIS — D05.00 LOBULAR CARCINOMA IN SITU OF UNSPECIFIED BREAST: ICD-10-CM

## 2024-05-22 PROCEDURE — 99024 POSTOP FOLLOW-UP VISIT: CPT

## 2024-05-22 NOTE — PHYSICAL EXAM
[Normal Supraclavicular Lymph Nodes] : normal supraclavicular lymph nodes [Normal Axillary Lymph Nodes] : normal axillary lymph nodes [FreeTextEntry1] : SC present for exam [de-identified] : right breast incision healing well without evidence of infection, hematoma, or seroma.

## 2024-05-22 NOTE — ASSESSMENT
[FreeTextEntry1] : IMP: 72yo F w/ hx of B/L breast excisions now w/ R IDP & ADH  R stereo bx 2/8/2024 - R 9:00 N2 ('T' clip): IDP w/ focus of ADH  s/p right breast lumpectomy on 4/19/2024: LCIS  PLAN: Return to Dr. Fernando Return in one year   All medical entries were at my, Dr. Ok Monson, direction.  I have reviewed the chart and agree that the record accurately reflects my personal performance of the history, physical exam, assessment, and plan.  Our office nurse practitioner was present for the duration of the office visit.

## 2024-05-22 NOTE — HISTORY OF PRESENT ILLNESS
[de-identified] : Ms. KIRBY NOLASCO is a 73 year old woman here for a post op visit.  PMH of esophageal cancer s/p rt, hypothyroidism  HX of B/L breast excision for R LCIS & L ALH  B/L mammo/sono 1/2024 - R 9:00 N2, 4 mm mass -> stereo bx recommended  BI-RADS 4C  R stereo bx 2/8/2024 - R 9:00 N2 ('T' clip): IDP w/ focus of ADH  Genetic testing negative   s/p right breast lumpectomy on 4/19/2024: LCIS  Patient already follows with Dr. Villalta

## 2024-09-23 NOTE — ED ADULT NURSE NOTE - NS ED NURSE LEVEL OF CONSCIOUSNESS ORIENTATION
[facility]   Blane Marti 70 y.o. female MRN: [id number]  Encounter: [id number]    Assessment & Plan     Assessment:  1. Onychomycosis  2. Venous insufficiency    Plan:  - Patient was seen/examined. All questions and concerns addressed  - Nails were on normal length today's visit due to patient trimming the nails herself  - Prescribed Tolnaftate solution for onychomycosis  - RTC in 3 months      History of Present Illness     HPI:  Blane Marti is a 70 y.o. female who presents with onychmycotic toenails. States that their nails are painful, elongated. They have difficulty applying their socks and shoes. The pressure within their shoe gear is painful and they have been unable to cut their nails adequately. She mentions that the penlac she was previously prescribed was not helping. The patient denies any nausea, vomiting, fever, chills, shortness of breath, or chest pains.      Review of Systems   Constitutional: Negative.    HENT: Negative.    Eyes: Negative.    Respiratory: Negative.    Cardiovascular: Negative.    Gastrointestinal: Negative.    Musculoskeletal: Negative   Skin: elongated thickened toenails.   Neurological: Negative.        Historical Information   Past Medical History:   Diagnosis Date    Arthritis     Depression     Difficult intubation 01/08/2021 1/8/2021 1st attempt: Mac3, Grade 4 view. 2nd attempt: Mac 3, Grade 4 view, unable to pass bougie, 3rd attempt: [place]:, Grade 4 view, unable to pass bougie, 4th attempt:(by attending): Mac3, Grade 4 view unable to pass bougie, 5th attempt(by attending): Glidescope 3, Grade 3 view, ETT placed successfully with stylet. LMA used successfully to ventilate in between attempts    Disease of thyroid gland     hypo    Edema     LAST ASSESSED: [id number]    GERD (gastroesophageal reflux disease)     Hypercholesterolemia     Hyperlipidemia     Hypertension     WELL CONTROLLED. CURRENTLY ON LISINOPRIL. LAST ASSESSED: [id number]     "Liver disease     Orthostatic hypotension 2020    Other headache syndrome     Other muscle spasm     LAST ASSESSED: 2014    Ovarian cyst 2006    Renal calculi      (spontaneous vaginal delivery) 1975    FEMALE     Past Surgical History:   Procedure Laterality Date    BACK SURGERY      HERNIATED DISC (L4/L5)    CHOLECYSTECTOMY      IR BIOPSY TRANSJUGULAR LIVER  11/10/2023    IR CEREBRAL ANGIOGRAPHY  2020    IR CEREBRAL ANGIOGRAPHY  2021    IR CEREBRAL ANGIOGRAPHY / INTERVENTION  2021    TONSILLECTOMY       Social History   Social History     Substance and Sexual Activity   Alcohol Use Never     Social History     Substance and Sexual Activity   Drug Use No     Social History     Tobacco Use   Smoking Status Former   Smokeless Tobacco Never   Tobacco Comments    pt \"quit about 20 years ago\"     Family History:   Family History   Problem Relation Age of Onset    Lung cancer Mother     Cancer Mother         MALIGNANT NEOPLASM    Hypertension Father     Seizures Father     Stroke Father     Heart attack Father     Diabetes Brother     Hypertension Son     Lung disease Son     Hyperthyroidism Maternal Aunt     Hypothyroidism Maternal Aunt     Breast cancer Cousin     Lung cancer Cousin     Breast cancer Cousin     Heart disease Other         CARDIAC DISORDER    Neuropathy Family        Meds/Allergies   Not in a hospital admission.  Allergies   Allergen Reactions    Tagamet [Cimetidine] Hives       Objective     Current Vitals:   Blood Pressure: 119/72 (24 1354)  Pulse: 74 (24 1354)  Temperature: (!) 97.2 °F (36.2 °C) (24 1354)  Temp Source: Temporal (24 1354)  Weight - Scale: 121 kg (266 lb) (24 1354)        /72 (BP Location: Right arm, Patient Position: Sitting, Cuff Size: Large)   Pulse 74   Temp (!) 97.2 °F (36.2 °C) (Temporal)   Wt 121 kg (266 lb)   LMP 2003   BMI 44.26 kg/m²       Lower Extremity Exam:    General Appearance: Alert, " cooperative, no distress.  HEENT: Head normocephalic, atraumatic, without obvious abnormality.  Heart: Normal rate and rhythm.  No murmurs or gallops noted.  Lungs: Non-labored breathing. No respiratory distress.  No wheezing, rhonchi, or rales.  Abdomen:  Soft, nontender, without distension.  Psychiatric: AAOx3  Lower Extremity:    Vascular:   Right Pt pulse is present  Right DP pulse is present  Left PT pulse is present  Left DP pulse is present.   CRT < 3 seconds at the digits.   1/4 edema noted at bilateral lower extremities.   Pedal hair is present. Skin temperature is WNL bilaterally.    Musculoskeletal:  MMT is 4/5 in all muscle compartments bilaterally.   ROM at the 1st MPJ and ankle joint are reduced bilaterally with the leg extended.   No gross deformities noted.     Dermatological:  Onychomycotic nailswith dystrophy and thickening to bilateral hallux nails  Nails are of normal length and width on today's visit    Neurological:  Gross sensation is intact.   Protective sensation is intact  Patient Denies numbness and/or paresthesias.    Oriented - self; Oriented - place; Oriented - time

## 2025-05-20 ENCOUNTER — APPOINTMENT (OUTPATIENT)
Dept: OBGYN | Facility: CLINIC | Age: 75
End: 2025-05-20
Payer: COMMERCIAL

## 2025-05-20 VITALS
HEIGHT: 65 IN | DIASTOLIC BLOOD PRESSURE: 70 MMHG | SYSTOLIC BLOOD PRESSURE: 108 MMHG | BODY MASS INDEX: 16.66 KG/M2 | WEIGHT: 100 LBS

## 2025-05-20 DIAGNOSIS — R87.619 UNSPECIFIED ABNORMAL CYTOLOGICAL FINDINGS IN SPECIMENS FROM CERVIX UTERI: ICD-10-CM

## 2025-05-20 PROCEDURE — 99203 OFFICE O/P NEW LOW 30 MIN: CPT

## 2025-05-20 PROCEDURE — 99459 PELVIC EXAMINATION: CPT

## 2025-05-20 PROCEDURE — 99213 OFFICE O/P EST LOW 20 MIN: CPT

## 2025-05-22 LAB — HPV HIGH+LOW RISK DNA PNL CVX: NOT DETECTED

## 2025-05-26 LAB — CYTOLOGY CVX/VAG DOC THIN PREP: NORMAL

## (undated) DEVICE — DRSG MAMMARY SUPPORT MED SIZE 2

## (undated) DEVICE — NDL HYPO SAFE 25G X 1" (ORANGE)

## (undated) DEVICE — DRSG MAMMARY SUPPORT MED SIZE 3

## (undated) DEVICE — DRSG MAMMARY SUPPORT SM SIZE 1

## (undated) DEVICE — SUT VICRYL 3-0 27" SH UNDYED

## (undated) DEVICE — POSITIONER PATIENT SAFETY STRAP 3X60"

## (undated) DEVICE — SOL IRR POUR H2O 500ML

## (undated) DEVICE — DRSG MAMMARY SUPPORT XL SIZE 5

## (undated) DEVICE — DRSG TELFA 3 X 8

## (undated) DEVICE — GOWN LG

## (undated) DEVICE — NDL HYPO SAFE 22G X 1.5" (BLACK)

## (undated) DEVICE — LAP PAD W RING 18 X 18"

## (undated) DEVICE — ELCTR ROCKER SWITCH PENCIL BLUE 10FT

## (undated) DEVICE — DRSG STERISTRIPS 0.5 X 4"

## (undated) DEVICE — DRAPE TOWEL BLUE 17" X 24"

## (undated) DEVICE — VENODYNE/SCD SLEEVE CALF MEDIUM

## (undated) DEVICE — PACK MINOR NO DRAPE

## (undated) DEVICE — DRAPE CHEST BREAST 106" X 122"

## (undated) DEVICE — DRSG TEGADERM 4X4.75"

## (undated) DEVICE — ELCTR GROUNDING PAD ADULT COVIDIEN

## (undated) DEVICE — ELCTR BOVIE TIP BLADE INSULATED 4" EDGE

## (undated) DEVICE — DRAPE INSTRUMENT POUCH 6.75" X 11"

## (undated) DEVICE — SUT SILK 2-0 30" SH

## (undated) DEVICE — SOL IRR POUR NS 0.9% 500ML

## (undated) DEVICE — DRSG MAMMARY SUPPORT LG SIZE 4

## (undated) DEVICE — POSITIONER FOAM EGG CRATE ULNAR 2PCS (PINK)

## (undated) DEVICE — SUT MONOCRYL 4-0 27" PS-2 UNDYED

## (undated) DEVICE — NDL HYPO SAFE 25G X 5/8" (ORANGE)

## (undated) DEVICE — RADIOGRAPHY DVC SPEC TRANSPEC

## (undated) DEVICE — WARMING BLANKET LOWER ADULT

## (undated) DEVICE — GLV 7.5 PROTEXIS (WHITE)